# Patient Record
Sex: MALE | Race: WHITE
[De-identification: names, ages, dates, MRNs, and addresses within clinical notes are randomized per-mention and may not be internally consistent; named-entity substitution may affect disease eponyms.]

---

## 2017-04-02 ENCOUNTER — TRANSCRIPTION ENCOUNTER (OUTPATIENT)
Age: 52
End: 2017-04-02

## 2021-07-08 ENCOUNTER — APPOINTMENT (OUTPATIENT)
Dept: SURGERY | Facility: CLINIC | Age: 56
End: 2021-07-08
Payer: COMMERCIAL

## 2021-07-08 PROCEDURE — 99203 OFFICE O/P NEW LOW 30 MIN: CPT

## 2021-07-08 PROCEDURE — 99072 ADDL SUPL MATRL&STAF TM PHE: CPT

## 2022-04-06 PROBLEM — Z00.00 ENCOUNTER FOR PREVENTIVE HEALTH EXAMINATION: Status: ACTIVE | Noted: 2022-04-06

## 2022-04-07 ENCOUNTER — TRANSCRIPTION ENCOUNTER (OUTPATIENT)
Age: 57
End: 2022-04-07

## 2022-04-07 ENCOUNTER — APPOINTMENT (OUTPATIENT)
Dept: ORTHOPEDIC SURGERY | Facility: CLINIC | Age: 57
End: 2022-04-07
Payer: COMMERCIAL

## 2022-04-07 VITALS — WEIGHT: 225 LBS | BODY MASS INDEX: 27.98 KG/M2 | HEIGHT: 75 IN

## 2022-04-07 PROCEDURE — 73610 X-RAY EXAM OF ANKLE: CPT | Mod: LT

## 2022-04-07 PROCEDURE — 99024 POSTOP FOLLOW-UP VISIT: CPT

## 2022-04-07 NOTE — HISTORY OF PRESENT ILLNESS
[Sudden] : sudden [2] : 2 [Not working due to injury] : Work status: not working due to injury [de-identified] : 3/17/22: fall in [place]. had surgery 3/13 for fx - closed fx per patient. no dm/tob. no prior ankle probs. \par 3/24/22: pain well controlled. mostly nwb in splint\par 4/7/22: nwb in boot. denies f/c/draiange.  [] : no [FreeTextEntry1] : left ankle  [FreeTextEntry3] : 3/13/22 [de-identified] : knee scooter

## 2022-04-07 NOTE — PHYSICAL EXAM
[5___] : [facility] 5[unfilled]/5 [Left] : left ankle [No loss of surgical correlation. Bony alignment acceptable. Hardware in appropriate position] : No loss of surgical correlation. Bony alignment acceptable. Hardware in appropriate position [] : no purulent drainage [FreeTextEntry8] : no sig ttp at fx site [de-identified] : extremity wwp [de-identified] : rollator

## 2022-04-07 NOTE — ASSESSMENT
[FreeTextEntry1] : nwb in cam boot\par discussed wound care\par elevation \par tylenol prn\par f/up 2 wks w/ xray

## 2022-04-21 ENCOUNTER — APPOINTMENT (OUTPATIENT)
Dept: ORTHOPEDIC SURGERY | Facility: CLINIC | Age: 57
End: 2022-04-21
Payer: COMMERCIAL

## 2022-04-21 PROCEDURE — 99024 POSTOP FOLLOW-UP VISIT: CPT

## 2022-04-21 PROCEDURE — 73610 X-RAY EXAM OF ANKLE: CPT | Mod: LT

## 2022-04-21 NOTE — PHYSICAL EXAM
[No loss of surgical correlation. Bony alignment acceptable. Hardware in appropriate position] : No loss of surgical correlation. Bony alignment acceptable. Hardware in appropriate position [Left] : left foot and ankle [Mild] : mild diffused ankle swelling [] : no lateral malleolus tenderness [5___] : plantar flexion 5[unfilled]/5 [FreeTextEntry8] : no sig ttp at fx site [de-identified] : extremity wwp [de-identified] : rollator [de-identified] : plantar flexion 30 degrees [TWNoteComboBox7] : dorsiflexion 10 degrees

## 2022-04-21 NOTE — HISTORY OF PRESENT ILLNESS
[Sudden] : sudden [2] : 2 [Sharp] : sharp [Shooting] : shooting [Occasional] : occasional [Not working due to injury] : Work status: not working due to injury [] : no [FreeTextEntry1] : left ankle  [FreeTextEntry3] : 3/13/22 [de-identified] : knee scooter [de-identified] : xray  [de-identified] : 3/17/22: fall in [place]. had surgery 3/13 for fx - closed fx per patient. no dm/tob. no prior ankle probs. \par 3/24/22: pain well controlled. mostly nwb in splint\par 4/7/22: nwb in boot. denies f/c/drainage. \par 4/21/22: no complaints. nwb in boot. denies f/c/draiange

## 2022-04-21 NOTE — IMAGING
[Left] : left ankle [No loss of surgical correlation. Bony alignment acceptable. Hardware in appropriate position] : No loss of surgical correlation. Bony alignment acceptable. Hardware in appropriate position

## 2022-04-21 NOTE — ASSESSMENT
[FreeTextEntry1] : wbat in cam boot\par begin PT\par elevation \par tylenol prn\par f/up 4 wks w/ xray

## 2022-05-19 ENCOUNTER — APPOINTMENT (OUTPATIENT)
Dept: ORTHOPEDIC SURGERY | Facility: CLINIC | Age: 57
End: 2022-05-19
Payer: COMMERCIAL

## 2022-05-19 VITALS — WEIGHT: 225 LBS | HEIGHT: 75 IN | BODY MASS INDEX: 27.98 KG/M2

## 2022-05-19 PROCEDURE — 99024 POSTOP FOLLOW-UP VISIT: CPT

## 2022-05-19 PROCEDURE — L4350: CPT

## 2022-05-19 PROCEDURE — 73610 X-RAY EXAM OF ANKLE: CPT | Mod: LT

## 2022-05-19 NOTE — PHYSICAL EXAM
[Mild] : mild diffused ankle swelling [Left] : left ankle [No loss of surgical correlation. Bony alignment acceptable. Hardware in appropriate position] : No loss of surgical correlation. Bony alignment acceptable. Hardware in appropriate position [NL (40)] : plantar flexion 40 degrees [5___] : eversion 5[unfilled]/5 [] : patient ambulates without assistive device [FreeTextEntry8] : no sig ttp at fx site [de-identified] : extremity wwp [de-identified] : rollator [de-identified] : plantar flexion 30 degrees [TWNoteComboBox7] : dorsiflexion 15 degrees

## 2022-05-19 NOTE — HISTORY OF PRESENT ILLNESS
[Sudden] : sudden [Shooting] : shooting [Occasional] : occasional [Not working due to injury] : Work status: not working due to injury [3] : 3 [0] : 0 [de-identified] : 3/17/22: fall in [place]. had surgery 3/13 for fx - closed fx per patient. no dm/tob. no prior ankle probs. \par \par 3/24/22: pain well controlled. mostly nwb in splint\par \par 4/7/22: nwb in boot. denies f/c/drainage. \par \par 4/21/22: no complaints. nwb in boot. denies f/c/draiange\par \par 5/19/22 no complaints. fully wb in boot. going to PT [] : no [FreeTextEntry1] : left ankle  [FreeTextEntry3] : 3/13/22 [de-identified] : cam boot [de-identified] :   [de-identified] : currently  [de-identified] : xray

## 2022-06-16 ENCOUNTER — APPOINTMENT (OUTPATIENT)
Dept: ORTHOPEDIC SURGERY | Facility: CLINIC | Age: 57
End: 2022-06-16
Payer: COMMERCIAL

## 2022-06-16 VITALS — HEIGHT: 75 IN | WEIGHT: 225 LBS | BODY MASS INDEX: 27.98 KG/M2

## 2022-06-16 PROCEDURE — 99213 OFFICE O/P EST LOW 20 MIN: CPT

## 2022-06-16 PROCEDURE — 73610 X-RAY EXAM OF ANKLE: CPT | Mod: LT

## 2022-06-16 NOTE — HISTORY OF PRESENT ILLNESS
[Sudden] : sudden [1] : 2 [0] : 0 [Dull/Aching] : dull/aching [Occasional] : occasional [Not working due to injury] : Work status: not working due to injury [de-identified] : 3/17/22: fall in [place]. had surgery 3/13 for fx - closed fx per patient. no dm/tob. no prior ankle probs. \par 3/24/22: pain well controlled. mostly nwb in splint\par 4/7/22: nwb in boot. denies f/c/drainage. \par 4/21/22: no complaints. nwb in boot. denies f/c/draiange\par 5/19/22 no complaints. fully wb in boot. going to PT\par 6/16/2022: ambulating without problem without boot or brace.  Is continuing in PT and doing well.  Only pain is with overuse and is generally mild [] : no [FreeTextEntry1] : left ankle  [FreeTextEntry3] : 3/13/22 [de-identified] :   [de-identified] : physical therapy [de-identified] : currently  [de-identified] : xray  [de-identified] : physical therapy

## 2022-06-16 NOTE — PHYSICAL EXAM
[Mild] : mild diffused ankle swelling [NL (40)] : plantar flexion 40 degrees [5___] : [facility] 5[unfilled]/5 [Left] : left ankle [No loss of surgical correlation. Bony alignment acceptable. Hardware in appropriate position] : No loss of surgical correlation. Bony alignment acceptable. Hardware in appropriate position [] : no lateral malleolus tenderness [NL 30)] : inversion 30 degrees [NL (20)] : eversion 20 degrees [2+] : dorsalis pedis pulse: 2+ [FreeTextEntry8] : no sig ttp at fx site [de-identified] : extremity wwp [de-identified] : rollator [TWNoteComboBox7] : dorsiflexion 15 degrees

## 2022-09-15 ENCOUNTER — APPOINTMENT (OUTPATIENT)
Dept: ORTHOPEDIC SURGERY | Facility: CLINIC | Age: 57
End: 2022-09-15

## 2022-09-15 VITALS — HEIGHT: 75 IN | BODY MASS INDEX: 27.98 KG/M2 | WEIGHT: 225 LBS

## 2022-09-15 DIAGNOSIS — S82.852D DISPLACED TRIMALLEOLAR FRACTURE OF LEFT LOWER LEG, SUBSEQUENT ENCOUNTER FOR CLOSED FRACTURE WITH ROUTINE HEALING: ICD-10-CM

## 2022-09-15 PROCEDURE — 73610 X-RAY EXAM OF ANKLE: CPT | Mod: LT

## 2022-09-15 PROCEDURE — 99213 OFFICE O/P EST LOW 20 MIN: CPT

## 2022-09-15 NOTE — PHYSICAL EXAM
[Mild] : mild diffused ankle swelling [NL (40)] : plantar flexion 40 degrees [NL 30)] : inversion 30 degrees [NL (20)] : eversion 20 degrees [5___] : [facility] 5[unfilled]/5 [2+] : dorsalis pedis pulse: 2+ [Left] : left ankle [No loss of surgical correlation. Bony alignment acceptable. Hardware in appropriate position] : No loss of surgical correlation. Bony alignment acceptable. Hardware in appropriate position [] : non-antalgic [FreeTextEntry8] : no sig ttp at fx site [de-identified] : rollator

## 2022-09-15 NOTE — HISTORY OF PRESENT ILLNESS
[Sudden] : sudden [1] : 2 [0] : 0 [Dull/Aching] : dull/aching [Occasional] : occasional [Not working due to injury] : Work status: not working due to injury [de-identified] : 3/17/22: fall in [place]. had surgery 3/13 for fx - closed fx per patient. no dm/tob. no prior ankle probs. \par \par 3/24/22: pain well controlled. mostly nwb in splint\par \par 4/7/22: nwb in boot. denies f/c/drainage. \par \par 4/21/22: no complaints. nwb in boot. denies f/c/draiange\par \par 5/19/22 no complaints. fully wb in boot. going to PT\par \par 6/16/2022: ambulating without problem without boot or brace.  Is continuing in PT and doing well.  Only pain is with overuse and is generally mild\par \par 09/15/2022: no sig pain. walking in regular shoes. completed PT.  [] : no [FreeTextEntry1] : left ankle  [FreeTextEntry3] : 3/13/22 [de-identified] :   [de-identified] : currently  [de-identified] : xray  [de-identified] : physical therapy

## 2022-10-12 ENCOUNTER — APPOINTMENT (OUTPATIENT)
Dept: ORTHOPEDIC SURGERY | Facility: CLINIC | Age: 57
End: 2022-10-12

## 2022-10-12 VITALS — HEIGHT: 75 IN | BODY MASS INDEX: 27.98 KG/M2 | WEIGHT: 225 LBS

## 2022-10-12 DIAGNOSIS — Z78.9 OTHER SPECIFIED HEALTH STATUS: ICD-10-CM

## 2022-10-12 PROCEDURE — 72110 X-RAY EXAM L-2 SPINE 4/>VWS: CPT

## 2022-10-12 PROCEDURE — 99213 OFFICE O/P EST LOW 20 MIN: CPT

## 2022-10-12 RX ORDER — CYCLOBENZAPRINE HYDROCHLORIDE 10 MG/1
10 TABLET, FILM COATED ORAL 3 TIMES DAILY
Qty: 90 | Refills: 0 | Status: ACTIVE | COMMUNITY
Start: 2022-10-12 | End: 1900-01-01

## 2022-10-12 RX ORDER — METHYLPREDNISOLONE 4 MG/1
4 TABLET ORAL
Qty: 1 | Refills: 0 | Status: ACTIVE | COMMUNITY
Start: 2022-10-12 | End: 1900-01-01

## 2022-10-12 NOTE — PHYSICAL EXAM
[Flexion] : flexion [Extension] : extension [Bending to left] : bending to left [Bending to right] : bending to right [] : negative sitting straight leg raise [de-identified] : he has diminished sensation to light touch lateral right thigh to the level of the knee compare to the contralateral left side which is normal

## 2022-10-12 NOTE — HISTORY OF PRESENT ILLNESS
[Lower back] : lower back [8] : 8 [3] : 3 [Dull/Aching] : dull/aching [Constant] : constant [Meds] : meds [Physical therapy] : physical therapy [Extending back] : extending back [Exercising] : exercising [de-identified] : 10-12-22- He states chronic right lower back pain with radicular complaints laterally over the right leg to the knee. symptoms worse with prolonged standing. He has tried otc nsaids. In year's past he did therapy with some help. \par \par He works as a \par \par denies contributory pmh [] : no [FreeTextEntry3] : CHRONIC [FreeTextEntry5] : PT STATED HE HAS BEEN HAVING CONSTANT BACK PAIN  [FreeTextEntry7] : LOWER BACK TO RIGHT KNEE

## 2022-10-12 NOTE — IMAGING
[Facet arthropathy] : Facet arthropathy [Spondylolysis] : Spondylolysis [FreeTextEntry1] : L2-3 retrolisthesis

## 2022-11-09 ENCOUNTER — APPOINTMENT (OUTPATIENT)
Dept: ORTHOPEDIC SURGERY | Facility: CLINIC | Age: 57
End: 2022-11-09

## 2022-11-16 ENCOUNTER — APPOINTMENT (OUTPATIENT)
Dept: ORTHOPEDIC SURGERY | Facility: CLINIC | Age: 57
End: 2022-11-16

## 2022-11-16 VITALS — HEIGHT: 75 IN | WEIGHT: 225 LBS | BODY MASS INDEX: 27.98 KG/M2

## 2022-11-16 DIAGNOSIS — M54.16 RADICULOPATHY, LUMBAR REGION: ICD-10-CM

## 2022-11-16 DIAGNOSIS — M51.36 OTHER INTERVERTEBRAL DISC DEGENERATION, LUMBAR REGION: ICD-10-CM

## 2022-11-16 PROCEDURE — 99213 OFFICE O/P EST LOW 20 MIN: CPT

## 2022-11-16 NOTE — PHYSICAL EXAM
[Flexion] : flexion [Extension] : extension [Bending to left] : bending to left [Bending to right] : bending to right [] : negative sitting straight leg raise [de-identified] : he has diminished sensation to light touch lateral right thigh to the level of the knee compare to the contralateral left side which is normal

## 2022-11-16 NOTE — HISTORY OF PRESENT ILLNESS
[Lower back] : lower back [3] : 3 [0] : 0 [Dull/Aching] : dull/aching [Constant] : constant [Meds] : meds [Physical therapy] : physical therapy [Extending back] : extending back [Exercising] : exercising [de-identified] : 10-12-22- He states chronic right lower back pain with radicular complaints laterally over the right leg to the knee. symptoms worse with prolonged standing. He has tried otc nsaids. In year's past he did therapy with some help. \par \par He works as a \par \par denies contributory pmh\par \par 11/16/22 pt stated he has made significant improvement with pain. not attending pt regularly  due to scheduling.  No more pain radiating down leg.  [] : no [FreeTextEntry3] : CHRONIC [FreeTextEntry5] : PT STATED HE HAS BEEN HAVING CONSTANT BACK PAIN  [FreeTextEntry7] : LOWER BACK TO RIGHT KNEE

## 2022-11-16 NOTE — ASSESSMENT
[FreeTextEntry1] : 57 M with RLE Radic that has imrpoved \par C/w flexeril as needed.  new PT rx provided so he can go to PT by his job and work it into his schedule.  \par FU on as needed basis

## 2023-10-27 NOTE — ASSESSMENT
[FreeTextEntry1] : wbat\par transition to brace\par continue PT\par ice/elevate'\par nsads prn\par f/up 1 month w/ ankle xray chest pain